# Patient Record
Sex: MALE | Race: WHITE
[De-identification: names, ages, dates, MRNs, and addresses within clinical notes are randomized per-mention and may not be internally consistent; named-entity substitution may affect disease eponyms.]

---

## 2017-07-27 ENCOUNTER — HOSPITAL ENCOUNTER (OUTPATIENT)
Dept: HOSPITAL 61 - ENDOS | Age: 52
Discharge: HOME | End: 2017-07-27
Attending: INTERNAL MEDICINE
Payer: COMMERCIAL

## 2017-07-27 VITALS — SYSTOLIC BLOOD PRESSURE: 115 MMHG | DIASTOLIC BLOOD PRESSURE: 55 MMHG

## 2017-07-27 DIAGNOSIS — E78.00: ICD-10-CM

## 2017-07-27 DIAGNOSIS — K57.30: ICD-10-CM

## 2017-07-27 DIAGNOSIS — F41.9: ICD-10-CM

## 2017-07-27 DIAGNOSIS — Z86.69: ICD-10-CM

## 2017-07-27 DIAGNOSIS — K64.0: Primary | ICD-10-CM

## 2017-07-27 DIAGNOSIS — K21.9: ICD-10-CM

## 2017-07-27 PROCEDURE — 45378 DIAGNOSTIC COLONOSCOPY: CPT

## 2017-07-28 NOTE — CONS
DATE OF CONSULTATION:  07/27/2017



REFERRING PHYSICIAN:  SATURNINO Latif



HISTORY OF PRESENT ILLNESS:  A 51-year-old  male with past medical

history significant for hypothyroidism, hyperlipidemia, gastroesophageal reflux

disease is seen with a 2-week history of intermittent rectal bleeding.  The

patient has had no diarrhea or constipation with the bleeding.  It has ____

stopped.  There is no family history of colon polyps or colon cancer.  He has

never undergone colonoscopy except 8 years ago which was normal.  He is

otherwise without additional complaints.



PAST MEDICAL HISTORY:  Hypothyroidism, hyperlipidemia, gastroesophageal reflux

disease.



ALLERGIES:  None.



MEDICATIONS:  Include fenofibrate, Synthroid, Reglan, omeprazole, propranolol,

Crestor, sulfasalazine, Topamax and Effexor.



SOCIAL HISTORY:  He does not drink or smoke.  He is an RN.



FAMILY HISTORY:  Noncontributory.



PAST SURGICAL HISTORY:  Noncontributory.



PHYSICAL EXAMINATION:

GENERAL:  Reveals a well-nourished, well-developed  male.

VITAL SIGNS:  Temperature is 97, pulse 58, respirations 20.

HEENT:  Normocephalic and atraumatic head.  Pupils and extraocular muscles not

tested.  Sclerae anicteric.

NECK:  Supple.

LUNGS:  Clear.

CARDIOVASCULAR:  Reveals an S1, S2 without S3, S4 or appreciable murmur.

ABDOMEN:  Soft abdomen, normal bowel sounds, without appreciable

hepatosplenomegaly.

EXTREMITIES:  Reveals no cyanosis, clubbing or edema.



IMPRESSION:  Rectal bleeding, etiology is to be determined.  Differential does

include colon polyps, inflammatory bowel disease, hemorrhoids, colon cancer,

AVMs.  Therefore, recommend colonoscopy to further assess.  Risks and benefits

of procedure including risk of hemorrhaging, perforation during the operation

have been discussed.  The patient is willing to proceed at this time.

 



______________________________

CR BATISTA MD



DR:  SSP/ace  JOB#:  [id number] / [id number]

DD:  07/27/2017 13:38  DT:  07/28/2017 04:49

## 2019-06-05 ENCOUNTER — APPOINTMENT (RX ONLY)
Dept: URBAN - METROPOLITAN AREA CLINIC 39 | Facility: CLINIC | Age: 54
Setting detail: DERMATOLOGY
End: 2019-06-05

## 2019-06-05 DIAGNOSIS — L91.8 OTHER HYPERTROPHIC DISORDERS OF THE SKIN: ICD-10-CM

## 2019-06-05 DIAGNOSIS — D18.0 HEMANGIOMA: ICD-10-CM

## 2019-06-05 DIAGNOSIS — Z71.89 OTHER SPECIFIED COUNSELING: ICD-10-CM

## 2019-06-05 DIAGNOSIS — L85.8 OTHER SPECIFIED EPIDERMAL THICKENING: ICD-10-CM

## 2019-06-05 DIAGNOSIS — D22 MELANOCYTIC NEVI: ICD-10-CM

## 2019-06-05 DIAGNOSIS — L82.1 OTHER SEBORRHEIC KERATOSIS: ICD-10-CM

## 2019-06-05 DIAGNOSIS — L81.4 OTHER MELANIN HYPERPIGMENTATION: ICD-10-CM

## 2019-06-05 DIAGNOSIS — L57.8 OTHER SKIN CHANGES DUE TO CHRONIC EXPOSURE TO NONIONIZING RADIATION: ICD-10-CM

## 2019-06-05 PROBLEM — M12.9 ARTHROPATHY, UNSPECIFIED: Status: ACTIVE | Noted: 2019-06-05

## 2019-06-05 PROBLEM — D18.01 HEMANGIOMA OF SKIN AND SUBCUTANEOUS TISSUE: Status: ACTIVE | Noted: 2019-06-05

## 2019-06-05 PROBLEM — D23.5 OTHER BENIGN NEOPLASM OF SKIN OF TRUNK: Status: ACTIVE | Noted: 2019-06-05

## 2019-06-05 PROBLEM — E03.9 HYPOTHYROIDISM, UNSPECIFIED: Status: ACTIVE | Noted: 2019-06-05

## 2019-06-05 PROBLEM — D22.5 MELANOCYTIC NEVI OF TRUNK: Status: ACTIVE | Noted: 2019-06-05

## 2019-06-05 PROBLEM — K21.9 GASTRO-ESOPHAGEAL REFLUX DISEASE WITHOUT ESOPHAGITIS: Status: ACTIVE | Noted: 2019-06-05

## 2019-06-05 PROCEDURE — 99203 OFFICE O/P NEW LOW 30 MIN: CPT

## 2019-06-05 PROCEDURE — ? PRESCRIPTION

## 2019-06-05 PROCEDURE — ? COUNSELING

## 2019-06-05 PROCEDURE — ? EDUCATIONAL RESOURCES PROVIDED

## 2019-06-05 PROCEDURE — ? TREATMENT REGIMEN

## 2019-06-05 RX ORDER — UREA 40 %
CREAM (GRAM) TOPICAL BID
Qty: 1 | Refills: 6 | Status: ERX | COMMUNITY
Start: 2019-06-05

## 2019-06-05 RX ADMIN — Medication: at 16:09

## 2019-06-05 ASSESSMENT — LOCATION SIMPLE DESCRIPTION DERM
LOCATION SIMPLE: RIGHT EYEBROW
LOCATION SIMPLE: RIGHT UPPER BACK
LOCATION SIMPLE: LEFT AXILLARY VAULT
LOCATION SIMPLE: LEFT ELBOW
LOCATION SIMPLE: LEFT ANTERIOR NECK
LOCATION SIMPLE: RIGHT ELBOW
LOCATION SIMPLE: RIGHT AXILLARY VAULT
LOCATION SIMPLE: CHEST

## 2019-06-05 ASSESSMENT — LOCATION DETAILED DESCRIPTION DERM
LOCATION DETAILED: LEFT ELBOW
LOCATION DETAILED: RIGHT AXILLARY VAULT
LOCATION DETAILED: LEFT AXILLARY VAULT
LOCATION DETAILED: LEFT MEDIAL SUPERIOR CHEST
LOCATION DETAILED: RIGHT MEDIAL UPPER BACK
LOCATION DETAILED: RIGHT ELBOW
LOCATION DETAILED: LEFT INFERIOR LATERAL NECK
LOCATION DETAILED: RIGHT CENTRAL EYEBROW
LOCATION DETAILED: RIGHT INFERIOR UPPER BACK

## 2019-06-05 ASSESSMENT — LOCATION ZONE DERM
LOCATION ZONE: FACE
LOCATION ZONE: NECK
LOCATION ZONE: TRUNK
LOCATION ZONE: ARM
LOCATION ZONE: AXILLAE

## 2019-06-05 ASSESSMENT — PAIN INTENSITY VAS: HOW INTENSE IS YOUR PAIN 0 BEING NO PAIN, 10 BEING THE MOST SEVERE PAIN POSSIBLE?: NO PAIN

## 2021-02-03 ENCOUNTER — APPOINTMENT (RX ONLY)
Dept: URBAN - METROPOLITAN AREA CLINIC 39 | Facility: CLINIC | Age: 56
Setting detail: DERMATOLOGY
End: 2021-02-03

## 2021-02-03 DIAGNOSIS — L85.8 OTHER SPECIFIED EPIDERMAL THICKENING: ICD-10-CM

## 2021-02-03 DIAGNOSIS — D22 MELANOCYTIC NEVI: ICD-10-CM

## 2021-02-03 PROBLEM — D48.5 NEOPLASM OF UNCERTAIN BEHAVIOR OF SKIN: Status: ACTIVE | Noted: 2021-02-03

## 2021-02-03 PROCEDURE — 11102 TANGNTL BX SKIN SINGLE LES: CPT

## 2021-02-03 PROCEDURE — ? TREATMENT REGIMEN

## 2021-02-03 PROCEDURE — ? BIOPSY BY SHAVE METHOD

## 2021-02-03 PROCEDURE — ? COUNSELING

## 2021-02-03 PROCEDURE — 99212 OFFICE O/P EST SF 10 MIN: CPT | Mod: 25

## 2021-02-03 ASSESSMENT — LOCATION DETAILED DESCRIPTION DERM
LOCATION DETAILED: RIGHT ELBOW
LOCATION DETAILED: LEFT ELBOW
LOCATION DETAILED: LEFT UPPER CUTANEOUS LIP

## 2021-02-03 ASSESSMENT — LOCATION SIMPLE DESCRIPTION DERM
LOCATION SIMPLE: RIGHT ELBOW
LOCATION SIMPLE: LEFT ELBOW
LOCATION SIMPLE: LEFT LIP

## 2021-02-03 ASSESSMENT — LOCATION ZONE DERM
LOCATION ZONE: ARM
LOCATION ZONE: LIP

## 2021-02-03 NOTE — PROCEDURE: TREATMENT REGIMEN
Detail Level: Zone
Plan: Urea / TAC compound: apply to elbows at night\\nPaper script given to patient

## 2021-11-10 ENCOUNTER — HOSPITAL ENCOUNTER (OUTPATIENT)
Dept: HOSPITAL 61 - ENDOS | Age: 56
Discharge: HOME | End: 2021-11-10
Attending: INTERNAL MEDICINE
Payer: COMMERCIAL

## 2021-11-10 VITALS — HEIGHT: 68 IN | BODY MASS INDEX: 41.77 KG/M2 | WEIGHT: 275.58 LBS

## 2021-11-10 VITALS — DIASTOLIC BLOOD PRESSURE: 77 MMHG | SYSTOLIC BLOOD PRESSURE: 144 MMHG

## 2021-11-10 VITALS — SYSTOLIC BLOOD PRESSURE: 164 MMHG | DIASTOLIC BLOOD PRESSURE: 82 MMHG

## 2021-11-10 DIAGNOSIS — G43.909: ICD-10-CM

## 2021-11-10 DIAGNOSIS — K22.89: ICD-10-CM

## 2021-11-10 DIAGNOSIS — R19.5: ICD-10-CM

## 2021-11-10 DIAGNOSIS — I10: ICD-10-CM

## 2021-11-10 DIAGNOSIS — K29.50: ICD-10-CM

## 2021-11-10 DIAGNOSIS — K22.0: ICD-10-CM

## 2021-11-10 DIAGNOSIS — K57.30: ICD-10-CM

## 2021-11-10 DIAGNOSIS — E03.9: ICD-10-CM

## 2021-11-10 DIAGNOSIS — R13.10: Primary | ICD-10-CM

## 2021-11-10 DIAGNOSIS — Z86.010: ICD-10-CM

## 2021-11-10 DIAGNOSIS — K64.0: ICD-10-CM

## 2021-11-10 DIAGNOSIS — K63.89: ICD-10-CM

## 2021-11-10 DIAGNOSIS — Z79.899: ICD-10-CM

## 2021-11-10 DIAGNOSIS — Z98.890: ICD-10-CM

## 2021-11-10 DIAGNOSIS — K21.9: ICD-10-CM

## 2021-11-10 DIAGNOSIS — K31.89: ICD-10-CM

## 2021-11-10 DIAGNOSIS — E78.00: ICD-10-CM

## 2021-11-10 PROCEDURE — 45378 DIAGNOSTIC COLONOSCOPY: CPT

## 2021-11-10 PROCEDURE — 43236 UPPR GI SCOPE W/SUBMUC INJ: CPT

## 2021-11-10 NOTE — HP
DATE OF SERVICE: 11/10/2021

ADMIT DATE: 11/10/2021

UPDATED HISTORY AND PHYSICAL



REFERRING PHYSICIAN:  CAROL Latif



REASON FOR CONSULTATION:  Dysphagia, possible achalasia as well as positive 

Cologuard.



HISTORY OF PRESENT ILLNESS:  A 56-year-old  male whose past medical 

history is significant for hypothyroidism as well as hyperlipidemia, history of 

colonic polyps, is seen for persistent dysphagia.  Esophageal motility study and

positive WYATT level are suggestive of a component of achalasia.  An EGD with 

Botox injection, as previous dilatations have been unsuccessful in resolving his

symptoms, are recommended and he is willing to proceed.  In addition, he has a 

positive Cologuard with his previous colon polyps, interval exam is recommended.



PAST MEDICAL HISTORY:  Hypothyroidism, hypertension, hyperlipidemia, urticaria, 

_____ reflux, migraines.



ALLERGIES:  None.



MEDICATIONS:  Include levothyroxine, metoclopramide, omeprazole, propranolol, 

rosuvastatin, sulfasalazine and venlafaxine.



SOCIAL HISTORY:  Former drinker, nonsmoker.



PAST SURGICAL HISTORY:  Significant for eye surgery and appendectomy.



REVIEW OF SYSTEMS:  Per records.



PHYSICAL EXAMINATION:

GENERAL:  Reveals a well-nourished, well-developed male who is alert, 

cooperative, no acute distress.

VITAL SIGNS:  Temperature 97.1, pulse 60, respiratory rate 20.

LUNGS:  Clear.

CARDIOVASCULAR:  Reveals an S1, S2, without S3, S4 or appreciable murmur.

ABDOMEN:  Reveals a soft abdomen, normal bowel sounds, without appreciable 

hepatosplenomegaly.

EXTREMITIES:  Reveals no cyanosis, clubbing or edema.



IMPRESSION:

1.  Dysphagia, positive WYATT, abnormal manometry. Trial of Botox for possible 

achalasia is recommended.  If this is helpful then further consideration of 

permanent surgical care will be pursued.

2.  History of colonic polyps.  Interval colonoscopy is recommended.  Risks and 

benefits were previously discussed.  The patient is willing to proceed at this 

time.







YEE/GISELLE

DR: SSP/nts   DD: 11/10/2021 07:17

DT: 11/10/2021 08:07   TID: [id number] The patient has been examined and the H&P has been reviewed:    I concur with the findings and no changes have occurred since H&P was written.    Anesthesia/Surgery risks, benefits and alternative options discussed and understood by patient/family.          Active Hospital Problems    Diagnosis  POA    Post-tonsillectomy hemorrhage [J95.89]  Yes      Resolved Hospital Problems    Diagnosis Date Resolved POA   No resolved problems to display.